# Patient Record
Sex: MALE | Race: ASIAN | NOT HISPANIC OR LATINO | URBAN - METROPOLITAN AREA
[De-identification: names, ages, dates, MRNs, and addresses within clinical notes are randomized per-mention and may not be internally consistent; named-entity substitution may affect disease eponyms.]

---

## 2024-08-12 ENCOUNTER — V-VISIT (OUTPATIENT)
Dept: URGENT CARE | Age: 29
End: 2024-08-12

## 2024-08-12 VITALS
DIASTOLIC BLOOD PRESSURE: 70 MMHG | SYSTOLIC BLOOD PRESSURE: 110 MMHG | WEIGHT: 182 LBS | OXYGEN SATURATION: 99 % | HEART RATE: 65 BPM | TEMPERATURE: 97.8 F | HEIGHT: 67 IN | BODY MASS INDEX: 28.56 KG/M2

## 2024-08-12 DIAGNOSIS — J01.90 ACUTE BACTERIAL RHINOSINUSITIS: Primary | ICD-10-CM

## 2024-08-12 DIAGNOSIS — B96.89 ACUTE BACTERIAL RHINOSINUSITIS: Primary | ICD-10-CM

## 2024-08-12 PROCEDURE — 99203 OFFICE O/P NEW LOW 30 MIN: CPT | Performed by: NURSE PRACTITIONER

## 2024-08-12 RX ORDER — FLUTICASONE PROPIONATE 50 MCG
1 SPRAY, SUSPENSION (ML) NASAL DAILY
Qty: 16 G | Refills: 0 | Status: SHIPPED | OUTPATIENT
Start: 2024-08-12 | End: 2024-08-19

## 2024-08-12 RX ORDER — PREDNISONE 20 MG/1
40 TABLET ORAL DAILY
Qty: 10 TABLET | Refills: 0 | Status: SHIPPED | OUTPATIENT
Start: 2024-08-12 | End: 2024-08-17

## 2024-08-12 RX ORDER — AMOXICILLIN 875 MG
875 TABLET ORAL 2 TIMES DAILY
Qty: 20 TABLET | Refills: 0 | Status: SHIPPED | OUTPATIENT
Start: 2024-08-12 | End: 2024-08-22

## 2024-08-12 ASSESSMENT — ENCOUNTER SYMPTOMS
CHILLS: 0
VOICE CHANGE: 0
COUGH: 0
ACTIVITY CHANGE: 0
SORE THROAT: 0
FATIGUE: 0
FEVER: 0
SINUS PRESSURE: 1
WHEEZING: 0
DIAPHORESIS: 0
UNEXPECTED WEIGHT CHANGE: 0
RHINORRHEA: 0
SINUS PAIN: 0
TROUBLE SWALLOWING: 0
APPETITE CHANGE: 0
SHORTNESS OF BREATH: 0

## 2024-08-23 ENCOUNTER — OFFICE VISIT (OUTPATIENT)
Dept: OTOLARYNGOLOGY | Facility: CLINIC | Age: 29
End: 2024-08-23

## 2024-08-23 VITALS — WEIGHT: 181 LBS

## 2024-08-23 DIAGNOSIS — J33.9 NASAL POLYPOSIS: Primary | ICD-10-CM

## 2024-08-23 PROCEDURE — 99203 OFFICE O/P NEW LOW 30 MIN: CPT | Performed by: OTOLARYNGOLOGY

## 2024-08-23 RX ORDER — FLUTICASONE PROPIONATE 50 MCG
1 SPRAY, SUSPENSION (ML) NASAL DAILY
COMMUNITY
Start: 2024-08-12

## 2024-08-23 NOTE — PROGRESS NOTES
Naomie Otero is a 28 year old male.    Chief Complaint   Patient presents with    Nose Problem     Patient is here for rhino sinusitis patient reports difficult to breath from his left nostril reports pressure and pain x 45 days        HISTORY OF PRESENT ILLNESS  About 6 weeks ago he noted the onset of swelling intranasally on the left.  Went to immediate care was started on amoxicillin prednisone and fluticasone with immediate improvement in symptoms only to have the symptoms return to 100% at this point.  Feels like his nose is constantly congested on the left cannot get air through there very easily.  No other signs, symptoms or complaints.      Social History     Socioeconomic History    Marital status: Unknown   Tobacco Use    Smoking status: Never     Passive exposure: Never    Smokeless tobacco: Never   Vaping Use    Vaping status: Never Used   Substance and Sexual Activity    Alcohol use: Yes     Comment: ocational    Drug use: Never       History reviewed. No pertinent family history.    History reviewed. No pertinent past medical history.    History reviewed. No pertinent surgical history.      REVIEW OF SYSTEMS    System Neg/Pos Details   Constitutional Negative Fatigue, fever and weight loss.   ENMT Negative Drooling.   Eyes Negative Blurred vision and vision changes.   Respiratory Negative Dyspnea and wheezing.   Cardio Negative Chest pain, irregular heartbeat/palpitations and syncope.   GI Negative Abdominal pain and diarrhea.   Endocrine Negative Cold intolerance and heat intolerance.   Neuro Negative Tremors.   Psych Negative Anxiety and depression.   Integumentary Negative Frequent skin infections, pigment change and rash.   Hema/Lymph Negative Easy bleeding and easy bruising.           PHYSICAL EXAM    Wt 181 lb (82.1 kg)        Constitutional Normal Overall appearance - Normal.   Psychiatric Normal Orientation - Oriented to time, place, person & situation. Appropriate mood and affect.   Neck  Exam Normal Inspection - Normal. Palpation - Normal. Parotid gland - Normal. Thyroid gland - Normal.   Eyes Normal Conjunctiva - Right: Normal, Left: Normal. Pupil - Right: Normal, Left: Normal. Fundus - Right: Normal, Left: Normal.   Neurological Normal Memory - Normal. Cranial nerves - Cranial nerves II through XII grossly intact.   Head/Face Normal Facial features - Normal. Eyebrows - Normal. Skull - Normal.        Nasopharynx Normal External nose - Normal. Lips/teeth/gums - Normal. Tonsils - Normal. Oropharynx - Normal.   Ears Normal Inspection - Right: Normal, Left: Normal. Canal - Right: Normal, Left: Normal. TM - Right: Normal, Left: Normal.   Skin Normal Inspection - Normal.        Lymph Detail Normal Submental. Submandibular. Anterior cervical. Posterior cervical. Supraclavicular.        Nose/Mouth/Throat Normal External nose - Normal. Lips/teeth/gums - Normal. Tonsils - Normal. Oropharynx - Normal.   Nose/Mouth/Throat Normal Nares - Right: Normal Left: Normal. Septum -Normal  Turbinates - Right: Normal, Left: Normal.  Large intranasal polyp on the left       Current Outpatient Medications:     fluticasone propionate 50 MCG/ACT Nasal Suspension, 1 spray by Nasal route daily. (Patient not taking: Reported on 8/23/2024), Disp: , Rfl:   ASSESSMENT AND PLAN    1. Nasal polyposis  Large nasal polyp inferior nasally on the left.  He does seem to have a little bit of swelling of the midface on the left as well antrochoanal polyp?  I did recommend a CT scan of his sinuses to evaluate his nasal and paranasal anatomy based on the findings intranasally on the left.  Return to see me after his scan.  - CT SINUS (CPT=70486); Future        This note was prepared using Dragon [employer] voice recognition dictation software. As a result errors may occur. When identified these errors have been corrected. While every attempt is made to correct errors during dictation discrepancies may still exist    Richard Pham,  MD    8/23/2024    2:51 PM

## 2024-08-29 ENCOUNTER — TELEPHONE (OUTPATIENT)
Dept: CASE MANAGEMENT | Age: 29
End: 2024-08-29

## 2024-08-29 NOTE — TELEPHONE ENCOUNTER
Hello     We are unable to reach patient. We are unable to locate a insurance plan for patient. The insurance card is not scanned. I have left message for patient to reschedule appointment. I will send a [employer] message.     Please advise    Thank you,  [place]  Referral specialist

## 2024-09-06 NOTE — TELEPHONE ENCOUNTER
Hello     I am referring to the CT scan. I am unable to reach patient.     Thank you,  [place]  Referral specialist